# Patient Record
(demographics unavailable — no encounter records)

---

## 2025-01-28 NOTE — HISTORY OF PRESENT ILLNESS
[FreeTextEntry1] : Kennedy is a 59 M who is presenting to establish care for issues with abdominal pain, early satiety. He was seen in the emergency room for these symptoms as he was also experiencing some loose stools at the time. He reports that now he is more on the constipated side. He has not really used anything for his bowel symptoms. He reports that he gets the abdominal pain in his mid abdomen. He reports the pain starts soon after eating. He has a hx diabetes and is on insulin. He does feel very bloated and nauseous when he gets the symptoms as well. He has never had an endoscopy or colonoscopy in the past. Does endorse some weight loss during this period as well.

## 2025-01-28 NOTE — REVIEW OF SYSTEMS
[Feeling Poorly] : feeling poorly [Recent Weight Loss (___ Lbs)] : recent [unfilled] ~Ulb weight loss [Abdominal Pain] : abdominal pain [Constipation] : constipation [Diarrhea] : diarrhea [Heartburn] : heartburn [Bloating (gassiness)] : bloating [Negative] : Heme/Lymph [Fever] : no fever [Chills] : no chills [Feeling Tired] : not feeling tired [Recent Weight Gain (___ Lbs)] : no recent weight gain [Vomiting] : no vomiting [Melena (black stool)] : no melena [Bleeding] : no bleeding [Fecal Incontinence (soiling)] : no fecal incontinence

## 2025-01-28 NOTE — ASSESSMENT
[FreeTextEntry1] : Kennedy is a 59 M who is presenting for evaluation for issues with abdominal pain/early satiety.   1. Abdominal pain/early satiety: suspect this is related to diabetic gastroparesis. Given weight loss and symptoms also concerned for obstructing lesion -Will plan on endoscopy for further evaluation to rule out gastric outlet obstruction -Will also plan on gastric emptying study -Counseled on gastroparesis diet - small frequent meals  2. Screening for colon cancer: average risk, no prior colonoscopy -colonoscopy with 2 day suprep   3. Alternating bowel habits - predominantly with constipation -Counseled on using miralax/fiber supplementation to help with regularity -Colonoscopy as above   I, Ely Barone, have explained the bowel prep, clear liquid diet 24 hours prior to procedure, risks, benefits, and alternatives of the procedures.  I have discussed the potential risks including, but not limited to perforation, bleeding, infection, cardiopulmonary complications, aspiration, or unforeseen complications